# Patient Record
Sex: MALE | Race: BLACK OR AFRICAN AMERICAN
[De-identification: names, ages, dates, MRNs, and addresses within clinical notes are randomized per-mention and may not be internally consistent; named-entity substitution may affect disease eponyms.]

---

## 2018-04-20 NOTE — DIAGNOSTIC IMAGING REPORT
Indication: Dominant pain

 

Technique: CT of the abdomen and pelvis utilizing automated exposure control with

intravenous contrast. Venous scanning performed.

 

CT dose: Total DLP 1692.62 mGycm; CTDI vol 21.91,19.27 mGy

 

Comparison: None

 

Findings:

Limited evaluation due to large body habitus resulting in photon starvation and

increased image noise. Within these limitations:

 

There is dependent atelectasis in the lung bases. Heart size is within normal limits.

There is no pericardial effusion.

 

Liver contour appears smooth. No focal hepatic mass lesion is appreciated.

Gallbladder appearance. Spleen, adrenal glands and pancreas grossly unremarkable.

Kidneys appear symmetric in size. Renal enhancement is symmetric. No evidence of

hydronephrosis bilaterally. Bladder and prostate are unremarkable in appearance.

 

No evidence of free intraperitoneal air. No evidence of bowel obstruction or

appreciable inflammatory change in the mesentery. Appendix is normal.

 

No pathologically enlarged lymphadenopathy. Abdominal aorta is normal in caliber.

 

No acute osseous abnormality is appreciated. A small fat-containing left inguinal

hernia.

 

IMPRESSION: 

Limited exam as above.

 

No definite evidence of acute intra-abdominal pathology. No evidence of bowel

obstruction. Appendix is normal.

 

The CT scanner at [facility] is accredited by the [employer] College [employer]

[employer] and the scans are performed using protocols designed to limit radiation

exposure to as low as reasonably achievable to attain images of sufficient resolution

adequate for diagnostic evaluation.

## 2018-04-20 NOTE — EMERGENCY ROOM REPORT
History of Present Illness


General


Chief Complaint:  Vomiting


Source:  Patient





Present Illness


HPI


27-year-old male presents ED for evaluation.  Patient planning of abdominal 

pain which started this morning.  Pain is epigastric, 8 out of 10, dull, 

nonradiating.  Notes nausea and vomiting.  Denies fevers or chills.  Denies 

chest pain or shortness of breath.  Denies any diarrhea.  No other aggravating 

or relieving factors.  Denies any other associated symptoms


Allergies:  


Coded Allergies:  


     PEACH (Verified  Allergy, Unknown, 4/20/18)





Patient History


Past Medical History:  none


Past Surgical History:  none


Pertinent Family History:  none


Social History:  Denies: smoking, alcohol use, drug use


Immunizations:  UTD


Reviewed Nursing Documentation:  PMH: Agreed; PSxH: Agreed





Nursing Documentation-PMH


Past Medical History:  No Stated History





Review of Systems


All Other Systems:  negative except mentioned in HPI





Physical Exam





Vital Signs








  Date Time  Temp Pulse Resp B/P (MAP) Pulse Ox O2 Delivery O2 Flow Rate FiO2


 


4/20/18 13:32 98.2 79 20 129/82 98 Room Air  





 98.2       








Sp02 EP Interpretation:  reviewed, normal


General Appearance:  no apparent distress, alert, GCS 15, non-toxic, obese


Head:  normocephalic, atraumatic


Eyes:  bilateral eye normal inspection, bilateral eye PERRL


ENT:  hearing grossly normal, normal pharynx, no angioedema, normal voice


Neck:  full range of motion, supple/symm/no masses


Respiratory:  chest non-tender, lungs clear, normal breath sounds, speaking 

full sentences


Cardiovascular #1:  regular rate, rhythm, no edema


Cardiovascular #2:  2+ carotid (R), 2+ carotid (L), 2+ radial (R), 2+ radial (L)

, 2+ dorsalis pedis (R), 2+ dorsalis pedis (L)


Gastrointestinal:  normal bowel sounds, soft, non-distended, no guarding, no 

rebound, tenderness - epigastric


Rectal:  deferred


Genitourinary:  normal inspection, no CVA tenderness


Musculoskeletal:  back normal, gait/station normal, normal range of motion, non-

tender


Neurologic:  alert, oriented x3, responsive, motor strength/tone normal, 

sensory intact, speech normal


Psychiatric:  judgement/insight normal, memory normal, mood/affect normal, no 

suicidal/homicidal ideation


Reflexes:  3+ bicep (R), 3+ bicep (L), 3+ tricep (R), 3+ tricep (L), 3+ knee (R)

, 3+ knee (L)


Skin:  normal color, no rash, warm/dry, well hydrated


Lymphatic:  no adenopathy





Medical Decision Making


Diagnostic Impression:  


 Primary Impression:  


 Pancreatitis


 Qualified Codes:  K85.90 - Acute pancreatitis without necrosis or infection, 

unspecified


ER Course


Hospital Course 


27-year-old male presents to ED with abdominal pain





Differential diagnoses include: BPH, cystitis, pyelonephritis, kidney stone 





Clinical course


Patient placed on stretcher.  Cardiac monitor.  After initial history and 

physical I ordered labs, IV fluids, UA, pain medication and CT scan 





Labs - noted leukocytosis, Hb/Hct stable.  electrolytes ok.  Lipase > 1100,


CT abdomen and pelvis - no acute pathology identified





discussed findings with patient and family.  Patient will require admission for 

IV hydration.





Because of insurance patient will be transferred





I feel this is a highly complex case requiring extensive working including EKG/

Rhythm strip, Xray/CT/US, Blood/urine lab work, repeat exams while in ED, and 

administration of strong opiates/narcotics for pain control, admission to 

hospital or close patient follow up.  





Diagnosis - acute pancreatitis 





transferred in serious condition





Labs








Test


  4/20/18


14:23


 


White Blood Count


  14.4 K/UL


(4.8-10.8)


 


Red Blood Count


  6.58 M/UL


(4.70-6.10)


 


Hemoglobin


  15.3 G/DL


(14.2-18.0)


 


Hematocrit


  49.5 %


(42.0-52.0)


 


Mean Corpuscular Volume 75 FL (80-99) 


 


Mean Corpuscular Hemoglobin


  23.3 PG


(27.0-31.0)


 


Mean Corpuscular Hemoglobin


Concent 30.9 G/DL


(32.0-36.0)


 


Red Cell Distribution Width


  13.2 %


(11.6-14.8)


 


Platelet Count


  223 K/UL


(150-450)


 


Mean Platelet Volume


  8.3 FL


(6.5-10.1)


 


Neutrophils (%) (Auto)  % (45.0-75.0) 


 


Lymphocytes (%) (Auto)  % (20.0-45.0) 


 


Monocytes (%) (Auto)  % (1.0-10.0) 


 


Eosinophils (%) (Auto)  % (0.0-3.0) 


 


Basophils (%) (Auto)  % (0.0-2.0) 


 


Differential Total Cells


Counted 100 


 


 


Neutrophils % (Manual) 86 % (45-75) 


 


Lymphocytes % (Manual) 6 % (20-45) 


 


Monocytes % (Manual) 8 % (1-10) 


 


Eosinophils % (Manual) 0 % (0-3) 


 


Basophils % (Manual) 0 % (0-2) 


 


Band Neutrophils 0 % (0-8) 


 


Platelet Estimate Adequate 


 


Platelet Morphology Normal 


 


Red Blood Cell Morphology  


 


Hypochromasia 1+ 


 


Urine Color Yellow 


 


Urine Appearance Clear 


 


Urine pH 6 (4.5-8.0) 


 


Urine Specific Gravity


  1.025


(1.005-1.035)


 


Urine Protein 1+ (NEGATIVE) 


 


Urine Glucose (UA)


  Negative


(NEGATIVE)


 


Urine Ketones


  Negative


(NEGATIVE)


 


Urine Occult Blood 2+ (NEGATIVE) 


 


Urine Nitrite


  Negative


(NEGATIVE)


 


Urine Bilirubin


  Negative


(NEGATIVE)


 


Urine Urobilinogen


  Normal MG/DL


(0.0-1.0)


 


Urine Leukocyte Esterase 1+ (NEGATIVE) 


 


Urine RBC


  2-4 /HPF (0 -


0)


 


Urine WBC


  0-2 /HPF (0 -


0)


 


Urine Squamous Epithelial


Cells Occasional


/LPF


 


Urine Amorphous Sediment


  Few /LPF


(NONE)


 


Urine Bacteria


  Occasional


/HPF (NONE)


 


Urine Mucus


  Few /LPF


(NONE/OCC)


 


Sodium Level


  137 MMOL/L


(136-145)


 


Potassium Level


  4.1 MMOL/L


(3.5-5.1)


 


Chloride Level


  103 MMOL/L


()


 


Carbon Dioxide Level


  28 MMOL/L


(21-32)


 


Anion Gap


  6 mmol/L


(5-15)


 


Blood Urea Nitrogen


  14 mg/dL


(7-18)


 


Creatinine


  0.9 MG/DL


(0.55-1.30)


 


Estimat Glomerular Filtration


Rate > 60 mL/min


(>60)


 


Glucose Level


  102 MG/DL


()


 


Calcium Level


  9.0 MG/DL


(8.5-10.1)


 


Total Bilirubin


  0.5 MG/DL


(0.2-1.0)


 


Aspartate Amino Transf


(AST/SGOT) 33 U/L (15-37) 


 


 


Alanine Aminotransferase


(ALT/SGPT) 31 U/L (12-78) 


 


 


Alkaline Phosphatase


  67 U/L


()


 


Total Protein


  8.4 G/DL


(6.4-8.2)


 


Albumin


  4.0 G/DL


(3.4-5.0)


 


Globulin 4.4 g/dL 


 


Albumin/Globulin Ratio 0.9 (1.0-2.7) 


 


Lipase


  1180 U/L


()








CT/MRI/US Diagnostic Results


CT/MRI/US Diagnostic Results :  


   Imaging Test Ordered:  CT A/P


   Impression


no acute process





Last Vital Signs








  Date Time  Temp Pulse Resp B/P (MAP) Pulse Ox O2 Delivery O2 Flow Rate FiO2


 


4/20/18 14:40 98.2 69 18 137/71 100 Room Air  





 98.2       








Status:  improved


Disposition:  ER T-[facility]


Condition:  Serious


Referrals:  


[facility],REFERRING (PCP)











Charbel Mon MD Apr 20, 2018 16:11

## 2020-07-06 ENCOUNTER — HOSPITAL ENCOUNTER (EMERGENCY)
Dept: HOSPITAL 72 - EMR | Age: 30
Discharge: HOME | End: 2020-07-06
Payer: MEDICAID

## 2020-07-06 VITALS — DIASTOLIC BLOOD PRESSURE: 88 MMHG | SYSTOLIC BLOOD PRESSURE: 135 MMHG

## 2020-07-06 VITALS — SYSTOLIC BLOOD PRESSURE: 131 MMHG | DIASTOLIC BLOOD PRESSURE: 84 MMHG

## 2020-07-06 VITALS — BODY MASS INDEX: 46.65 KG/M2 | WEIGHT: 315 LBS | HEIGHT: 69 IN

## 2020-07-06 DIAGNOSIS — R06.02: ICD-10-CM

## 2020-07-06 DIAGNOSIS — R09.81: Primary | ICD-10-CM

## 2020-07-06 PROCEDURE — 71045 X-RAY EXAM CHEST 1 VIEW: CPT

## 2020-07-06 PROCEDURE — 99283 EMERGENCY DEPT VISIT LOW MDM: CPT

## 2020-07-06 NOTE — NUR
ED Nurse Note:



Patient walked in to ER from home due to flu like symptoms, stated dizziness, 
lost smell x 1 week, nose bleed this morning. Denyed fever, N/V/D. Patient 
presented calm, O2 sat 96% on RA, even non-labored respiration

## 2020-07-06 NOTE — EMERGENCY ROOM REPORT
History of Present Illness


General


Chief Complaint:  Flu Like Symptoms


Source:  Patient





Present Illness


HPI


30-year-old male with no significant past medical history here complaining of 1 

week of loss of smell and taste.  Complains of minor congestion however denies 

cough, shortness of breath, abdominal pain, diarrhea, nausea vomiting, fever 

and chills.  Does not know if he came in contact with a cold with positive 

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 

from home.  Has not taken medication for symptom relief.  Oxygenation within 

normal limits.  Patient is afebrile.  Denies any tobacco smoke, and alcohol 

intake however smokes marijuana occasionally.  Complains of low appetite.  

Denies all other comorbidities.  Resting comfortably with stable vital signs.  

Speaking in full sentences, does not use any accessory muscles for breathing.


Allergies:  


Coded Allergies:  


     PEACH (Verified  Allergy, Unknown, 4/20/18)





COVID-19 Screening


Contact w/high risk pt:  No


Recent Travel to affected area:  No


Experienced COVID-19 symptoms?:  Yes


COVID-19 symptoms experienced:  Flu-Like Symptoms


COVID-19 Testing performed PTA:  No





Patient History


Past Surgical History:  none


Pertinent Family History:  none


Immunizations:  UTD


Reviewed Nursing Documentation:  PMH: Agreed; PSxH: Agreed





Nursing Documentation-PMH


Past Medical History:  No Stated History





Review of Systems


All Other Systems:  negative except mentioned in HPI





Physical Exam





Vital Signs








  Date Time  Temp Pulse Resp B/P (MAP) Pulse Ox O2 Delivery O2 Flow Rate FiO2


 


7/6/20 12:38 97.7 70 18 131/84 (100) 96 Room Air  








Sp02 EP Interpretation:  reviewed, normal


General Appearance:  no apparent distress, alert, GCS 15, non-toxic


Head:  normocephalic, atraumatic


Eyes:  bilateral eye normal inspection, bilateral eye PERRL


ENT:  hearing grossly normal, normal pharynx, no angioedema, normal voice


Neck:  full range of motion, supple/symm/no masses


Respiratory:  chest non-tender, lungs clear, normal breath sounds, speaking 

full sentences


Cardiovascular #1:  regular rate, rhythm, no edema, no murmur


Gastrointestinal:  normal bowel sounds, non tender, soft, non-distended, no 

guarding, no rebound


Genitourinary:  no CVA tenderness


Neurologic:  alert, oriented


Psychiatric:  judgement/insight normal, memory normal, mood/affect normal, no 

suicidal/homicidal ideation


Skin:  no rash


Lymphatic:  no adenopathy





Medical Decision Making


PA Attestation


All my diagnosis and treatment plans were reviewed ad discussed with my 

supervising physician Dr. Renteria


Diagnostic Impression:  


 Primary Impression:  


 Suspected 2019 novel coronavirus infection


ER Course


30-year-old male with no significant past medical history here complaining of 1 

week of loss of smell and taste.  Complains of minor congestion however denies 

cough, shortness of breath, abdominal pain, diarrhea, nausea vomiting, fever 

and chills.  Does not know if he came in contact with a cold with positive 

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 

from home.  Has not taken medication for symptom relief.  Oxygenation within 

normal limits.  Patient is afebrile.  Denies any tobacco smoke, and alcohol 

intake however smokes marijuana occasionally.  Complains of low appetite.  

Denies all other comorbidities.  Resting comfortably with stable vital signs.  

Speaking in full sentences, does not use any accessory muscles for breathing.





Ddx considered but are not limited to: Coronavirus, strep pharyngitis, URI, 

tonsillitis, peritonsillar abscess, influneza














Vital signs: are WNL, pt. is afebrile








 H&PE are most consistent with: Suspected COVID-19














ORDERS: Claritin-D, albuterol











ED INTERVENTIONS: None required at this time.























DISCHARGE: At this time pt. is stable for d/c to home. Will provide printed 

patient care instructions, and any necessary prescriptions. Care plan and 

follow up instructions have been discussed with the patient prior to discharge.

  Gave information to across the street to [phone] for patient to go get tested

, patient was advised for 14 days.  Patient take medication as directed, if 

worsening symptom, respiratory distress difficulty breathing return to the 

emergency room


Chest X-Ray Diagnostic Results


Chest X-Ray Diagnostic Results :  


   Chest X-Ray Ordered:  Yes


   # of Views/Limited/Complete:  1 View


   Indication:  Other


   EP Interpretation:  Yes


   PA Xray:  Interpretation reviewed, by supervising MD, and agrees with 

findings.


   Interpretation:  no consolidation, no effusion, no pneumothorax


   Impression:  No acute disease


   Electronically Signed by:  Michael Ulrich PA-C





Last Vital Signs








  Date Time  Temp Pulse Resp B/P (MAP) Pulse Ox O2 Delivery O2 Flow Rate FiO2


 


7/6/20 12:44  70 18   Room Air  


 


7/6/20 12:44 97.7   131/84 96   








Disposition:  HOME, SELF-CARE


Condition:  Stable


Scripts


Loratadine/Pseudoephedrine (CLARITIN-D 12 HOUR TABLET) 1 Each Tab.er.12h


1 TAB ORAL EVERY 12 HOURS, #20 TAB


   Prov: Michael Rossi         7/6/20 


Albuterol Sulfate (VENTOLIN HFA) 18 Gm Hfa.aer.ad


2 PUFFS INH EVERY 6 HOURS, #18 GM 0 Refills


   Prov: Michael Rossi         7/6/20


Patient Instructions:  Upper Respiratory Infection, Adult, Easy-to-Read





Additional Instructions:  


Take medication as directed, follow-up with your primary care provider, self 

isolate for 14 days, if worsening symptom return to the emergency room











Michael Rossi Jul 6, 2020 13:10